# Patient Record
Sex: MALE
[De-identification: names, ages, dates, MRNs, and addresses within clinical notes are randomized per-mention and may not be internally consistent; named-entity substitution may affect disease eponyms.]

---

## 2017-01-16 ENCOUNTER — HOSPITAL ENCOUNTER (OUTPATIENT)
Dept: HOSPITAL 45 - C.LABMFLN | Age: 67
Discharge: HOME | End: 2017-01-16
Attending: FAMILY MEDICINE
Payer: COMMERCIAL

## 2017-01-16 DIAGNOSIS — E10.22: ICD-10-CM

## 2017-01-16 DIAGNOSIS — N18.9: Primary | ICD-10-CM

## 2017-01-16 DIAGNOSIS — I12.9: ICD-10-CM

## 2017-01-16 DIAGNOSIS — E10.49: ICD-10-CM

## 2017-01-16 DIAGNOSIS — E78.5: ICD-10-CM

## 2017-01-16 LAB
ALT SERPL-CCNC: 33 U/L (ref 12–78)
ANION GAP SERPL CALC-SCNC: 7 MMOL/L (ref 3–11)
AST SERPL-CCNC: 23 U/L (ref 15–37)
BUN SERPL-MCNC: 22 MG/DL (ref 7–18)
BUN/CREAT SERPL: 17.2 (ref 10–20)
CALCIUM SERPL-MCNC: 8.6 MG/DL (ref 8.5–10.1)
CHLORIDE SERPL-SCNC: 105 MMOL/L (ref 98–107)
CHOLEST/HDLC SERPL: 3 {RATIO}
CO2 SERPL-SCNC: 29 MMOL/L (ref 21–32)
CREAT SERPL-MCNC: 1.3 MG/DL (ref 0.6–1.4)
GLUCOSE SERPL-MCNC: 109 MG/DL (ref 70–99)
GLUCOSE UR QL: 61 MG/DL
KETONES UR QL STRIP: 105 MG/DL
NITRITE UR QL STRIP: 83 MG/DL (ref 0–150)
PH UR: 183 MG/DL (ref 0–200)
POTASSIUM SERPL-SCNC: 4.1 MMOL/L (ref 3.5–5.1)
SODIUM SERPL-SCNC: 141 MMOL/L (ref 136–145)
VERY LOW DENSITY LIPOPROT CALC: 17 MG/DL

## 2017-01-17 LAB — EST. AVERAGE GLUCOSE BLD GHB EST-MCNC: 186 MG/DL

## 2017-05-11 ENCOUNTER — HOSPITAL ENCOUNTER (OUTPATIENT)
Dept: HOSPITAL 45 - C.LABMFLN | Age: 67
Discharge: HOME | End: 2017-05-11
Attending: FAMILY MEDICINE
Payer: COMMERCIAL

## 2017-05-11 DIAGNOSIS — E10.22: ICD-10-CM

## 2017-05-11 DIAGNOSIS — G72.41: Primary | ICD-10-CM

## 2017-05-11 DIAGNOSIS — E10.65: ICD-10-CM

## 2017-05-11 DIAGNOSIS — I12.9: ICD-10-CM

## 2017-05-11 DIAGNOSIS — N18.9: ICD-10-CM

## 2017-05-11 LAB
ALBUMIN/GLOB SERPL: 1.3 {RATIO} (ref 0.9–2)
ALP SERPL-CCNC: 77 U/L (ref 45–117)
ALT SERPL-CCNC: 40 U/L (ref 12–78)
ANION GAP SERPL CALC-SCNC: 7 MMOL/L (ref 3–11)
AST SERPL-CCNC: 22 U/L (ref 15–37)
BUN SERPL-MCNC: 34 MG/DL (ref 7–18)
BUN/CREAT SERPL: 24.1 (ref 10–20)
CALCIUM SERPL-MCNC: 8.9 MG/DL (ref 8.5–10.1)
CHLORIDE SERPL-SCNC: 109 MMOL/L (ref 98–107)
CHOLEST/HDLC SERPL: 3.8 {RATIO}
CO2 SERPL-SCNC: 26 MMOL/L (ref 21–32)
CREAT SERPL-MCNC: 1.4 MG/DL (ref 0.6–1.4)
GLOBULIN SER-MCNC: 2.9 GM/DL (ref 2.5–4)
GLUCOSE SERPL-MCNC: 141 MG/DL (ref 70–99)
GLUCOSE UR QL: 55 MG/DL
KETONES UR QL STRIP: 125 MG/DL
NITRITE UR QL STRIP: 144 MG/DL (ref 0–150)
PH UR: 209 MG/DL (ref 0–200)
POTASSIUM SERPL-SCNC: 4.1 MMOL/L (ref 3.5–5.1)
SODIUM SERPL-SCNC: 142 MMOL/L (ref 136–145)
VERY LOW DENSITY LIPOPROT CALC: 29 MG/DL

## 2017-05-12 LAB — EST. AVERAGE GLUCOSE BLD GHB EST-MCNC: 206 MG/DL

## 2017-07-10 ENCOUNTER — HOSPITAL ENCOUNTER (OUTPATIENT)
Dept: HOSPITAL 45 - C.LABMFLN | Age: 67
Discharge: HOME | End: 2017-07-10
Attending: FAMILY MEDICINE
Payer: COMMERCIAL

## 2017-07-10 DIAGNOSIS — R10.811: ICD-10-CM

## 2017-07-10 DIAGNOSIS — R19.7: Primary | ICD-10-CM

## 2017-07-10 LAB
ALBUMIN/GLOB SERPL: 1.3 {RATIO} (ref 0.9–2)
ALP SERPL-CCNC: 94 U/L (ref 45–117)
ALT SERPL-CCNC: 40 U/L (ref 12–78)
AMYLASE SERPL-CCNC: 57 U/L (ref 25–115)
ANION GAP SERPL CALC-SCNC: 6 MMOL/L (ref 3–11)
APPEARANCE UR: CLEAR
AST SERPL-CCNC: 21 U/L (ref 15–37)
BASOPHILS # BLD: 0.03 K/UL (ref 0–0.2)
BASOPHILS NFR BLD: 0.4 %
BILIRUB UR-MCNC: (no result) MG/DL
BUN SERPL-MCNC: 33 MG/DL (ref 7–18)
BUN/CREAT SERPL: 22.1 (ref 10–20)
CALCIUM SERPL-MCNC: 9.3 MG/DL (ref 8.5–10.1)
CHLORIDE SERPL-SCNC: 106 MMOL/L (ref 98–107)
CO2 SERPL-SCNC: 29 MMOL/L (ref 21–32)
COLOR UR: YELLOW
COMPLETE: YES
CREAT SERPL-MCNC: 1.5 MG/DL (ref 0.6–1.4)
EOSINOPHIL NFR BLD AUTO: 196 K/UL (ref 130–400)
GLOBULIN SER-MCNC: 3.3 GM/DL (ref 2.5–4)
GLUCOSE SERPL-MCNC: 89 MG/DL (ref 70–99)
HCT VFR BLD CALC: 42.9 % (ref 42–52)
IG%: 0.3 %
IMM GRANULOCYTES NFR BLD AUTO: 23.3 %
LYMPHOCYTES # BLD: 1.61 K/UL (ref 1.2–3.4)
MANUAL MICROSCOPIC REQUIRED?: NO
MCH RBC QN AUTO: 33.2 PG (ref 25–34)
MCHC RBC AUTO-ENTMCNC: 35 G/DL (ref 32–36)
MCV RBC AUTO: 94.9 FL (ref 80–100)
MONOCYTES NFR BLD: 9.4 %
NEUTROPHILS # BLD AUTO: 5.2 %
NEUTROPHILS NFR BLD AUTO: 61.4 %
NITRITE UR QL STRIP: (no result)
PH UR STRIP: 5 [PH] (ref 4.5–7.5)
PMV BLD AUTO: 10.5 FL (ref 7.4–10.4)
POTASSIUM SERPL-SCNC: 4.2 MMOL/L (ref 3.5–5.1)
RBC # BLD AUTO: 4.52 M/UL (ref 4.7–6.1)
REVIEW REQ?: NO
SODIUM SERPL-SCNC: 141 MMOL/L (ref 136–145)
SP GR UR STRIP: 1.02 (ref 1–1.03)
URINE BILL WITH OR WITHOUT MIC: (no result)
URINE EPITHELIAL CELL AUTO: (no result) /LPF (ref 0–5)
UROBILINOGEN UR-MCNC: (no result) MG/DL
WBC # BLD AUTO: 6.9 K/UL (ref 4.8–10.8)

## 2017-09-08 ENCOUNTER — HOSPITAL ENCOUNTER (OUTPATIENT)
Dept: HOSPITAL 45 - C.LABMFLN | Age: 67
Discharge: HOME | End: 2017-09-08
Attending: FAMILY MEDICINE
Payer: COMMERCIAL

## 2017-09-08 DIAGNOSIS — G72.41: Primary | ICD-10-CM

## 2017-09-08 DIAGNOSIS — I10: ICD-10-CM

## 2017-09-08 DIAGNOSIS — E10.65: ICD-10-CM

## 2017-09-08 DIAGNOSIS — Z12.5: ICD-10-CM

## 2017-09-08 LAB
ALBUMIN/GLOB SERPL: 1 {RATIO} (ref 0.9–2)
ALP SERPL-CCNC: 91 U/L (ref 45–117)
ALT SERPL-CCNC: 31 U/L (ref 12–78)
ANION GAP SERPL CALC-SCNC: 3 MMOL/L (ref 3–11)
AST SERPL-CCNC: 22 U/L (ref 15–37)
BUN SERPL-MCNC: 25 MG/DL (ref 7–18)
BUN/CREAT SERPL: 19.6 (ref 10–20)
CALCIUM SERPL-MCNC: 8.6 MG/DL (ref 8.5–10.1)
CHLORIDE SERPL-SCNC: 108 MMOL/L (ref 98–107)
CHOLEST/HDLC SERPL: 3.4 {RATIO}
CO2 SERPL-SCNC: 30 MMOL/L (ref 21–32)
CREAT SERPL-MCNC: 1.3 MG/DL (ref 0.6–1.4)
CREAT UR-MCNC: 110 MG/DL
GLOBULIN SER-MCNC: 3.6 GM/DL (ref 2.5–4)
GLUCOSE SERPL-MCNC: 114 MG/DL (ref 70–99)
GLUCOSE UR QL: 52 MG/DL
KETONES UR QL STRIP: 96 MG/DL
NITRITE UR QL STRIP: 148 MG/DL (ref 0–150)
PH UR: 178 MG/DL (ref 0–200)
POTASSIUM SERPL-SCNC: 4.1 MMOL/L (ref 3.5–5.1)
PSA SERPL-MCNC: 1.35 NG/ML (ref 0–4)
RATIO: 60.4 MCG/MG (ref 0–30)
SODIUM SERPL-SCNC: 141 MMOL/L (ref 136–145)
VERY LOW DENSITY LIPOPROT CALC: 30 MG/DL

## 2017-09-09 LAB — EST. AVERAGE GLUCOSE BLD GHB EST-MCNC: 171 MG/DL

## 2017-10-30 ENCOUNTER — HOSPITAL ENCOUNTER (OUTPATIENT)
Dept: HOSPITAL 45 - C.RAD1850 | Age: 67
Discharge: HOME | End: 2017-10-30
Attending: INTERNAL MEDICINE
Payer: COMMERCIAL

## 2017-10-30 DIAGNOSIS — M86.9: Primary | ICD-10-CM

## 2017-10-30 NOTE — DIAGNOSTIC IMAGING REPORT
R FOOT MIN 3 VIEWS ROUTINE



CLINICAL HISTORY: M86.9 Toe osteomyelitis,    



COMPARISON: None.



DISCUSSION: 3 views reveal no fractures or dislocations are visualized. There

are destructive changes involving the distal one half of the distal phalanx the

great toe, consistent with the clinical diagnosis of osteomyelitis. Vascular

calcifications are visualized. There is mild dorsal soft tissue swelling.    



IMPRESSION: Destructive changes involving the distal phalanx the great toe,

consistent with osteomyelitis







Electronically signed by:  Deon Louise M.D.

10/30/2017 11:07 AM



Dictated Date/Time:  10/30/2017 11:06 AM

## 2017-12-22 ENCOUNTER — HOSPITAL ENCOUNTER (OUTPATIENT)
Dept: HOSPITAL 45 - C.LAB1850 | Age: 67
Discharge: HOME | End: 2017-12-22
Attending: NURSE PRACTITIONER
Payer: COMMERCIAL

## 2017-12-22 DIAGNOSIS — E10.65: Primary | ICD-10-CM

## 2017-12-22 LAB — EST. AVERAGE GLUCOSE BLD GHB EST-MCNC: 140 MG/DL

## 2018-01-22 ENCOUNTER — HOSPITAL ENCOUNTER (OUTPATIENT)
Dept: HOSPITAL 45 - C.RAD1850 | Age: 68
Discharge: HOME | End: 2018-01-22
Attending: INTERNAL MEDICINE
Payer: COMMERCIAL

## 2018-01-22 DIAGNOSIS — M86.9: Primary | ICD-10-CM

## 2018-01-22 NOTE — DIAGNOSTIC IMAGING REPORT
RIGHT FIRST TOE 3 VIEWS



CLINICAL HISTORY: Osteomyelitis.



FINDINGS: 3 views of the right first toe are compared to study dated 10/30/2017.

Destructive change is again seen involving the mid distal aspect of the second

distal phalanx. There is increasing sclerosis from 10/30/2017. Persistent foci

of lucency/erosion are noted. Overlying soft tissue edema is observed. The first

proximal phalanx is normal in appearance. There is atherosclerotic calcification

of the regional arteries.



IMPRESSION:



1. Destructive change is again seen involving the first distal phalanx with

increasing sclerosis as compared to 10/30/2017. The appearance suggests acute

versus acute on chronic osteomyelitis. Clinical correlation will be required.



2. Soft tissue edema in the forefoot suggests cellulitis.







Electronically signed by:  Dimitrios Moncada M.D.

1/22/2018 10:27 AM



Dictated Date/Time:  1/22/2018 10:25 AM

## 2018-01-26 ENCOUNTER — HOSPITAL ENCOUNTER (OUTPATIENT)
Dept: HOSPITAL 45 - C.LABMFLN | Age: 68
Discharge: HOME | End: 2018-01-26
Attending: FAMILY MEDICINE
Payer: COMMERCIAL

## 2018-01-26 DIAGNOSIS — E78.5: Primary | ICD-10-CM

## 2018-01-26 DIAGNOSIS — E10.65: ICD-10-CM

## 2018-01-26 LAB
ALBUMIN SERPL-MCNC: 3.8 GM/DL (ref 3.4–5)
ALP SERPL-CCNC: 70 U/L (ref 45–117)
ALT SERPL-CCNC: 31 U/L (ref 12–78)
AST SERPL-CCNC: 24 U/L (ref 15–37)
BUN SERPL-MCNC: 40 MG/DL (ref 7–18)
CALCIUM SERPL-MCNC: 9.1 MG/DL (ref 8.5–10.1)
CO2 SERPL-SCNC: 23 MMOL/L (ref 21–32)
CREAT SERPL-MCNC: 1.46 MG/DL (ref 0.6–1.4)
GLUCOSE SERPL-MCNC: 142 MG/DL (ref 70–99)
KETONES UR QL STRIP: 130 MG/DL
PH UR: 229 MG/DL (ref 0–200)
POTASSIUM SERPL-SCNC: 4.7 MMOL/L (ref 3.5–5.1)
PROT SERPL-MCNC: 7.1 GM/DL (ref 6.4–8.2)
SODIUM SERPL-SCNC: 139 MMOL/L (ref 136–145)

## 2018-01-27 LAB — HBA1C MFR BLD: 7.5 % (ref 4.5–5.6)

## 2018-03-19 ENCOUNTER — HOSPITAL ENCOUNTER (OUTPATIENT)
Dept: HOSPITAL 45 - C.RAD1850 | Age: 68
Discharge: HOME | End: 2018-03-19
Attending: INTERNAL MEDICINE
Payer: COMMERCIAL

## 2018-03-19 DIAGNOSIS — M86.9: Primary | ICD-10-CM

## 2018-03-19 NOTE — DIAGNOSTIC IMAGING REPORT
R FOOT MIN 3 VIEWS ROUTINE



CLINICAL HISTORY: M86.9 Toe osteomyelitis, [id number] osteomyelitis



COMPARISON: 10/30/2017 1/22/2018



DISCUSSION: Partial interval healing of the destructive process of the distal

phalanx great toe. Increased sclerotic change would indicate a potential partial

healing. No new or interval finding of that noted. Mild soft tissue edema.



IMPRESSION: Partial interval healing of the destructive process distal phalanx

great toe.











The above report was generated using voice recognition software.  It may contain

grammatical, syntax or spelling errors.







Electronically signed by:  Clive Oliveira M.D.

3/19/2018 10:41 AM



Dictated Date/Time:  3/19/2018 10:39 AM

## 2018-05-17 ENCOUNTER — HOSPITAL ENCOUNTER (OUTPATIENT)
Dept: HOSPITAL 45 - C.RAD1850 | Age: 68
Discharge: HOME | End: 2018-05-17
Attending: INTERNAL MEDICINE
Payer: COMMERCIAL

## 2018-05-17 DIAGNOSIS — M20.61: ICD-10-CM

## 2018-05-17 DIAGNOSIS — M86.9: Primary | ICD-10-CM

## 2018-05-17 NOTE — DIAGNOSTIC IMAGING REPORT
R FOOT MIN 3 VIEWS ROUTINE



CLINICAL HISTORY: M86.9 Toe osteomyelitis,    



COMPARISON: 3/19/2018



DISCUSSION: No acute fractures are visualized. There is a persistent deformity

involving the distal phalanx the great toe consistent with the history of

osteomyelitis. There are no progressive destructive changes. There are vascular

calcifications present.



IMPRESSION: 

1. Persistent deformity involving the distal phalanx the great toe consistent

with history of osteomyelitis. No progressive destructive changes are visualized







Electronically signed by:  Deon Louise M.D.

5/17/2018 2:25 PM



Dictated Date/Time:  5/17/2018 2:22 PM

## 2018-05-22 ENCOUNTER — HOSPITAL ENCOUNTER (OUTPATIENT)
Dept: HOSPITAL 45 - C.LABMFLN | Age: 68
Discharge: HOME | End: 2018-05-22
Attending: FAMILY MEDICINE
Payer: COMMERCIAL

## 2018-05-22 DIAGNOSIS — I10: Primary | ICD-10-CM

## 2018-05-22 DIAGNOSIS — E10.29: ICD-10-CM

## 2018-05-22 DIAGNOSIS — E10.65: ICD-10-CM

## 2018-05-22 LAB
ALBUMIN SERPL-MCNC: 3.9 GM/DL (ref 3.4–5)
ALP SERPL-CCNC: 76 U/L (ref 45–117)
ALT SERPL-CCNC: 32 U/L (ref 12–78)
AST SERPL-CCNC: 25 U/L (ref 15–37)
BUN SERPL-MCNC: 37 MG/DL (ref 7–18)
CALCIUM SERPL-MCNC: 9 MG/DL (ref 8.5–10.1)
CO2 SERPL-SCNC: 28 MMOL/L (ref 21–32)
CREAT SERPL-MCNC: 1.43 MG/DL (ref 0.6–1.4)
GLUCOSE SERPL-MCNC: 117 MG/DL (ref 70–99)
KETONES UR QL STRIP: 122 MG/DL
PH UR: 216 MG/DL (ref 0–200)
POTASSIUM SERPL-SCNC: 4.3 MMOL/L (ref 3.5–5.1)
PROT SERPL-MCNC: 7.2 GM/DL (ref 6.4–8.2)
SODIUM SERPL-SCNC: 141 MMOL/L (ref 136–145)

## 2018-05-23 LAB — HBA1C MFR BLD: 8.5 % (ref 4.5–5.6)
